# Patient Record
Sex: MALE | URBAN - METROPOLITAN AREA
[De-identification: names, ages, dates, MRNs, and addresses within clinical notes are randomized per-mention and may not be internally consistent; named-entity substitution may affect disease eponyms.]

---

## 2018-03-01 ENCOUNTER — TRANSFERRED RECORDS (OUTPATIENT)
Dept: HEALTH INFORMATION MANAGEMENT | Facility: CLINIC | Age: 33
End: 2018-03-01

## 2018-03-02 ENCOUNTER — TRANSFERRED RECORDS (OUTPATIENT)
Dept: HEALTH INFORMATION MANAGEMENT | Facility: CLINIC | Age: 33
End: 2018-03-02

## 2018-03-09 ENCOUNTER — TRANSFERRED RECORDS (OUTPATIENT)
Dept: HEALTH INFORMATION MANAGEMENT | Facility: CLINIC | Age: 33
End: 2018-03-09

## 2018-03-19 ENCOUNTER — TELEPHONE (OUTPATIENT)
Dept: ORTHOPEDICS | Facility: CLINIC | Age: 33
End: 2018-03-19

## 2018-03-19 NOTE — TELEPHONE ENCOUNTER
Rianna from Dr. Hernandez's office at [facility] called requesting patient be seen for L thigh mass.  Patient scheduled to see Dr. Pierce on 3/21/18 at 8:30.    Films in PACS.  I will be faxing clinic notes and surgical path report to urgent scanning today 3/19.    Nell pierre

## 2018-03-21 ENCOUNTER — OFFICE VISIT (OUTPATIENT)
Dept: ORTHOPEDICS | Facility: CLINIC | Age: 33
End: 2018-03-21
Payer: COMMERCIAL

## 2018-03-21 VITALS — WEIGHT: 188 LBS | BODY MASS INDEX: 24.92 KG/M2 | HEIGHT: 73 IN

## 2018-03-21 DIAGNOSIS — M79.89 SOFT TISSUE MASS: Primary | ICD-10-CM

## 2018-03-21 ASSESSMENT — ENCOUNTER SYMPTOMS
POLYDIPSIA: 0
FEVER: 0
NAIL CHANGES: 0
FLANK PAIN: 0
EYE WATERING: 0
SKIN CHANGES: 0
HEMATURIA: 0
EYE PAIN: 0
HALLUCINATIONS: 0
EYE IRRITATION: 0
NECK PAIN: 1
WEIGHT LOSS: 0
DOUBLE VISION: 0
DYSURIA: 0
STIFFNESS: 0
ALTERED TEMPERATURE REGULATION: 1
CHILLS: 0
ARTHRALGIAS: 1
DECREASED APPETITE: 0
DIFFICULTY URINATING: 0
POOR WOUND HEALING: 0
WEIGHT GAIN: 0
BACK PAIN: 1
FATIGUE: 0
INCREASED ENERGY: 0
NIGHT SWEATS: 1
MUSCLE CRAMPS: 0
POLYPHAGIA: 1
MYALGIAS: 0
EYE REDNESS: 0
MUSCLE WEAKNESS: 0

## 2018-03-21 NOTE — LETTER
3/21/2018       RE: Nicholas Thoennes  [address] [address]     Dear Colleague,    Thank you for referring your patient, Nicholas Thoennes, to the [facility] at [facility]. Please see a copy of my visit note below.    I was present with the resident during the history and exam.  I discussed the case with the resident and agree with the findings as documented in the assessment and plan.    Orthopedic Surgery Consult / History and Physical    Primary care provider: No Ref-Primary, Physician           Chief Concern:   Evaluate left thigh soft tissue mass           History of Present Illness:   This patient is a healthy 32 year old male who presents with a left anterior thigh mass.  First noticed approximately 2-3 months ago along along his anterior thigh.  No pain.  No impairment in function of his quadriceps or leg.  No numbness associated.  No associated lymph nodes.    He thought he should have it checked out, went and saw his primary doctor who ordered an MRI showing a lesion along the anterior quadriceps muscle which is bright on T2, dark on T1.  Attempt was made at needle aspiration.  No fluid or tissue was able to be removed.  Subsequently patient was subsequently referred to our clinic for further evaluation.            Past Medical History:   None         Past Surgical History:   Age 7  Past Surgical History:   Procedure Laterality Date     KNEE SURGERY              Social History:   Occupation:      Social History   Substance Use Topics     Smoking status: Current Every Day Smoker     Types: Dip, chew, snus or snuff     Smokeless tobacco: Current User     Alcohol use Yes            Family History:   Denies family history of bleeding or clotting disorders  Family History   Problem Relation Age of Onset     Other Cancer Father      Colon Cancer Paternal Grandmother             Allergies:   No Known Allergies          Medications:   Anticoagulants:  None  No current outpatient prescriptions on file.     No current facility-administered medications for this visit.               Physical Exam:   General: awake, alert, cooperative, no apparent distress, appears stated age  HEENT: normal  Respiratory: breathing non-labored  Cardiovascular: peripheral pulses palpable and symmetric, capillary refill < 2sec  Skin: no rashes or lesions  Neurological: CN II-XII grossly intact  Musculoskeletal:     LLE   Skin C/D/I   No palpable mass   Motor: Quads, hamstrings, EHL, TA, FHL, GS 5/5   SILT on SP, DP, S, S, and T nerve territories   Circulation: palpable DP and TP, foot warm     No inguinal lymph nodes         Data:   Imagin18 MRI L thigh:  14cm length lesion within quadriceps tendon, fluid filled.  No enhancement on contrast MRI, no surrounding edema. No heterogeneity.  Appears to be a ganglion           Assessment and Plan:   Assessment:  32M with left thigh quadriceps tendon ganglion. Not a tumor as does not enhance on contrast MRI.  Benign process.     Plan:  1. Discussed that we could excise this, or continue to watch.  We do not think it is dangerous at all.  2. After discussion the patient would like to observe. We'll plan to re-evaluate with contrast MRI around 2018     Seen with Dr. Pierce, documentation by:    Mark Mix MD  Orthopaedic Surgery PGY-4       Again, thank you for allowing me to participate in the care of your patient.      Sincerely,    Alec Pierce MD

## 2018-03-21 NOTE — MR AVS SNAPSHOT
"              After Visit Summary   3/21/2018    Nicholas Thoennes    MRN: [id number]           Patient Information     Date Of Birth          1985        Visit Information        Provider Department      3/21/2018 8:30 AM Alec Pierce MD [facility]        Today's Diagnoses     Soft tissue mass    -  1       Follow-ups after your visit        Who to contact     Please call your clinic at [phone] to:    Ask questions about your health    Make or cancel appointments    Discuss your medicines    Learn about your test results    Speak to your doctor            Additional Information About Your Visit        MyChart Information     [employer] is an electronic gateway that provides easy, online access to your medical records. With [employer], you can request a clinic appointment, read your test results, renew a prescription or communicate with your care team.     To sign up for [employer] visit the website at [email or web address]   You will be asked to enter the access code listed below, as well as some personal information. Please follow the directions to create your username and password.     Your access code is: [email or web address]  Expires: 2018  6:30 AM     Your access code will  in 90 days. If you need help or a new code, please contact your [facility] or call [phone] for assistance.        Care EveryWhere ID     This is your Care EveryWhere ID. This could be used by other organizations to access your [place] medical records  [id number]        Your Vitals Were     Height BMI (Body Mass Index)                1.854 m (6' 1\") 24.8 kg/m2           Blood Pressure from Last 3 Encounters:   No data found for BP    Weight from Last 3 Encounters:   18 85.3 kg (188 lb)              Today, you had the following     No orders found for display       Primary Care Provider Fax #    Physician No Ref-Primary [phone]       No address on file   "      Equal Access to Services     [facility] : Hadii aad ku hadheidymarilou Diaz, wahalleserena mckeon, edwardhumberto patterson. So [facility] [phone].    ATENCIÓN: Si habla español, tiene a españa disposición servicios gratuitos de asistencia lingüística. Llame al [phone].    We comply with applicable federal civil rights laws and [place] laws. We do not discriminate on the basis of race, color, national origin, age, disability, sex, sexual orientation, or gender identity.            Thank you!     Thank you for choosing [facility]  for your care. Our goal is always to provide you with excellent care. Hearing back from our patients is one way we can continue to improve our services. Please take a few minutes to complete the written survey that you may receive in the mail after your visit with us. Thank you!             Your Updated Medication List - Protect others around you: Learn how to safely use, store and throw away your medicines at www.disposemymeds.org.      Notice  As of 3/21/2018 12:20 PM    You have not been prescribed any medications.

## 2018-03-21 NOTE — NURSING NOTE
"Reason For Visit:   Chief Complaint   Patient presents with     Consult     Pt. states that he is here today for Left Thigh Mass. He mention that he noticed the mass about 3 months, increase in size. HX: Left Thigh Mass Core Biopsy. on 03/09/2018 @ [facility].  Referring:  ISAAK HOFFMAN       Pain Assessment  Patient Currently in Pain: No               HEIGHT: 6' 1\", WEIGHT: 188 lbs 0 oz, BMI: Body mass index is 24.8 kg/(m^2).      No current outpatient prescriptions on file.        No Known Allergies            "

## 2018-03-21 NOTE — PROGRESS NOTES
Orthopedic Surgery Consult / History and Physical    Primary care provider: No Ref-Primary, Physician           Chief Concern:   Evaluate left thigh soft tissue mass           History of Present Illness:   This patient is a healthy 32 year old male who presents with a left anterior thigh mass.  First noticed approximately 2-3 months ago along along his anterior thigh.  No pain.  No impairment in function of his quadriceps or leg.  No numbness associated.  No associated lymph nodes.    He thought he should have it checked out, went and saw his primary doctor who ordered an MRI showing a lesion along the anterior quadriceps muscle which is bright on T2, dark on T1.  Attempt was made at needle aspiration.  No fluid or tissue was able to be removed.  Subsequently patient was subsequently referred to our clinic for further evaluation.            Past Medical History:   None         Past Surgical History:   Age 7  Past Surgical History:   Procedure Laterality Date     KNEE SURGERY              Social History:   Occupation:      Social History   Substance Use Topics     Smoking status: Current Every Day Smoker     Types: Dip, chew, snus or snuff     Smokeless tobacco: Current User     Alcohol use Yes            Family History:   Denies family history of bleeding or clotting disorders  Family History   Problem Relation Age of Onset     Other Cancer Father      Colon Cancer Paternal Grandmother             Allergies:   No Known Allergies         Medications:   Anticoagulants:  None  No current outpatient prescriptions on file.     No current facility-administered medications for this visit.               Physical Exam:   General: awake, alert, cooperative, no apparent distress, appears stated age  HEENT: normal  Respiratory: breathing non-labored  Cardiovascular: peripheral pulses palpable and symmetric, capillary refill < 2sec  Skin: no rashes or lesions  Neurological: CN II-XII grossly  intact  Musculoskeletal:     LLE   Skin C/D/I   No palpable mass   Motor: Quads, hamstrings, EHL, TA, FHL, GS 5/5   SILT on SP, DP, S, S, and T nerve territories   Circulation: palpable DP and TP, foot warm     No inguinal lymph nodes         Data:   Imagin18 MRI L thigh:  14cm length lesion within quadriceps tendon, fluid filled.  No enhancement on contrast MRI, no surrounding edema. No heterogeneity.  Appears to be a ganglion           Assessment and Plan:   Assessment:  32M with left thigh quadriceps tendon ganglion. Not a tumor as does not enhance on contrast MRI.  Benign process.     Plan:  1. Discussed that we could excise this, or continue to watch.  We do not think it is dangerous at all.  2. After discussion the patient would like to observe. We'll plan to re-evaluate with contrast MRI around 2018             Seen with Dr. Pierce, documentation by:    Mark Mix MD  Orthopaedic Surgery PGY-4        Answers for HPI/ROS submitted by the patient on 3/21/2018   General Symptoms: Yes  Skin Symptoms: Yes  HENT Symptoms: No  EYE SYMPTOMS: Yes  HEART SYMPTOMS: No  LUNG SYMPTOMS: No  INTESTINAL SYMPTOMS: No  URINARY SYMPTOMS: Yes  REPRODUCTIVE SYMPTOMS: No  SKELETAL SYMPTOMS: Yes  BLOOD SYMPTOMS: No  NERVOUS SYSTEM SYMPTOMS: No  MENTAL HEALTH SYMPTOMS: No  Fever: No  Loss of appetite: No  Weight loss: No  Weight gain: No  Fatigue: No  Night sweats: Yes  Chills: No  Increased stress: No  Excessive hunger: Yes  Excessive thirst: No  Feeling hot or cold when others believe the temperature is normal: Yes  Loss of height: No  Post-operative complications: No  Surgical site pain: No  Hallucinations: No  Change in or Loss of Energy: No  Hyperactivity: No  Confusion: No  Changes in hair: No  Changes in moles/birth marks: No  Itching: No  Rashes: Yes  Changes in nails: No  Acne: No  Change in facial hair: No  Warts: No  Non-healing sores: No  Scarring: No  Flaking of skin: No  Color changes of hands/feet in  cold : No  Sun sensitivity: No  Skin thickening: No  Eye pain: No  Vision loss: No  Dry eyes: No  Watery eyes: No  Eye bulging: No  Double vision: No  Flashing of lights: No  Spots: No  Floaters: Yes  Redness: No  Crossed eyes: No  Tunnel Vision: No  Yellowing of eyes: No  Eye irritation: No  Trouble holding urine or incontinence: No  Pain or burning: No  Trouble starting or stopping: No  Increased frequency of urination: Yes  Blood in urine: No  Decreased frequency of urination: No  Frequent nighttime urination: No  Flank pain: No  Difficulty emptying bladder: No  Back pain: Yes  Muscle aches: No  Neck pain: Yes  Joint pain: Yes  Bone pain: No  Muscle cramps: No  Muscle weakness: No  Joint stiffness: No  Bone fracture: No

## 2018-03-28 DIAGNOSIS — M79.89 SOFT TISSUE MASS: Primary | ICD-10-CM

## 2018-08-24 ENCOUNTER — RADIANT APPOINTMENT (OUTPATIENT)
Dept: MRI IMAGING | Facility: CLINIC | Age: 33
End: 2018-08-24
Attending: ORTHOPAEDIC SURGERY
Payer: COMMERCIAL

## 2018-08-24 ENCOUNTER — OFFICE VISIT (OUTPATIENT)
Dept: ORTHOPEDICS | Facility: CLINIC | Age: 33
End: 2018-08-24
Payer: COMMERCIAL

## 2018-08-24 VITALS — HEIGHT: 73 IN | WEIGHT: 189.6 LBS | BODY MASS INDEX: 25.13 KG/M2

## 2018-08-24 DIAGNOSIS — M79.89 SOFT TISSUE MASS: ICD-10-CM

## 2018-08-24 DIAGNOSIS — M67.40 GANGLION CYST: Primary | ICD-10-CM

## 2018-08-24 RX ORDER — GADOBUTROL 604.72 MG/ML
7.5 INJECTION INTRAVENOUS ONCE
Status: COMPLETED | OUTPATIENT
Start: 2018-08-24 | End: 2018-08-24

## 2018-08-24 RX ADMIN — GADOBUTROL 7.5 ML: 604.72 INJECTION INTRAVENOUS at 13:14

## 2018-08-24 NOTE — MR AVS SNAPSHOT
"              After Visit Summary   2018    Nicholas Thoennes    MRN: [id number]           Patient Information     Date Of Birth          1985        Visit Information        Provider Department      2018 2:45 PM [facility]        Today's Diagnoses     Ganglion cyst    -  1       Follow-ups after your visit        Who to contact     Please call your clinic at [phone] to:    Ask questions about your health    Make or cancel appointments    Discuss your medicines    Learn about your test results    Speak to your doctor            Additional Information About Your Visit        MyChart Information     [employer] is an electronic gateway that provides easy, online access to your medical records. With [employer], you can request a clinic appointment, read your test results, renew a prescription or communicate with your care team.     To sign up for Care Team Connectt visit the website at [email or web address]   You will be asked to enter the access code listed below, as well as some personal information. Please follow the directions to create your username and password.     Your access code is: [email or web address]  Expires: 2018  6:30 AM     Your access code will  in 90 days. If you need help or a new code, please contact your [facility] or call [phone] for assistance.        Care EveryWhere ID     This is your Care EveryWhere ID. This could be used by other organizations to access your [place] medical records  [id number]        Your Vitals Were     Height BMI (Body Mass Index)                1.845 m (6' 0.64\") 25.26 kg/m2           Blood Pressure from Last 3 Encounters:   No data found for BP    Weight from Last 3 Encounters:   18 86 kg (189 lb 9.6 oz)   18 85.3 kg (188 lb)              Today, you had the following     No orders found for display       Primary Care Provider Fax #    Physician No Ref-Primary " [phone]       No address on file        Equal Access to Services     JOSEPH DEQUAN : Hadii aad ku hadheidymarilou Diaz, isaias mckeon, edwardsonido coonmaserena howard, humberto anguianoin hayaajohnathan clineremi fekiarramaribell ceballos. So [facility] [phone].    ATENCIÓN: Si habla español, tiene a españa disposición servicios gratuitos de asistencia lingüística. Llame al [phone].    We comply with applicable federal civil rights laws and [place] laws. We do not discriminate on the basis of race, color, national origin, age, disability, sex, sexual orientation, or gender identity.            Thank you!     Thank you for choosing [facility]  for your care. Our goal is always to provide you with excellent care. Hearing back from our patients is one way we can continue to improve our services. Please take a few minutes to complete the written survey that you may receive in the mail after your visit with us. Thank you!             Your Updated Medication List - Protect others around you: Learn how to safely use, store and throw away your medicines at www.disposemymeds.org.      Notice  As of 8/24/2018  3:00 PM    You have not been prescribed any medications.

## 2018-08-24 NOTE — NURSING NOTE
"Reason For Visit:   Chief Complaint   Patient presents with     Left Thigh - RECHECK     Follow up right thigh mass       Ht 1.845 m (6' 0.64\")  Wt 86 kg (189 lb 9.6 oz)  BMI 25.26 kg/m2    Pain Assessment  Patient Currently in Pain: Ellis Kraus, ATC  "

## 2018-08-24 NOTE — LETTER
8/24/2018       RE: Nicholas Thoennes  [address] [address]     Dear Colleague,    Thank you for referring your patient, Nicholas Thoennes, [facility]. Please see a copy of my visit note below.    Chief Complaint: Left thigh quadriceps tendon ganglion    HPI: Justin is a 33-year-old male who is here for follow-up and review of MRI of his left thigh.  He has a history of a left thigh quadriceps tendon ganglion cyst.  We wanted to have a repeat MRI since the last time we saw him 5 months ago to see if there have been any changes.  Per his report, he denies any pain or discomfort recently.  He cannot feel it in the thigh.  He is doing all of the activities he wants to do.  No other concerns.    Physical Exam: Justin is a healthy-appearing 33-year-old male who is alert and oriented in no apparent distress.  He has a nonantalgic reciprocal gait today.  His left thigh shows no palpable mass or swelling.  He has full range of motion and strength.  He is neurovascularly intact distally.    Imaging: MRI of the left thigh with and without contrast was done today.  This shows a similar appearance to the previous MRI in terms of enhancement.  This is a fluid-filled cyst.  It has gotten smaller in circumference, although the length remains similar.  This appears to be a benign ganglion cyst that is improved from the previous scan.    Impression: 33-year-old male with a history of a left thigh quadriceps tendon ganglion, without symptoms and smaller on MRI today    Plan: We are satisfied with the MRI images today showing that this confirms a ganglion mass that has decreased in size.  We would not recommend any further imaging for this, and less he noted increased pain or swelling in that area.  He can participate in all activities as tolerated.  He can call us if he has any concerns.  All questions answered.  Patient was also  examined by Dr. Pierce and he agrees with the plan of care.       I was present with the PA during the history and exam.  I discussed the case with the PA and agree with the findings as documented in the assessment and plan.    Again, thank you for allowing me to participate in the care of your patient.      Sincerely,    Alec Pierce MD

## 2022-12-17 NOTE — PROGRESS NOTES
Chief Complaint: Left thigh quadriceps tendon ganglion    HPI: Justin is a 33-year-old male who is here for follow-up and review of MRI of his left thigh.  He has a history of a left thigh quadriceps tendon ganglion cyst.  We wanted to have a repeat MRI since the last time we saw him 5 months ago to see if there have been any changes.  Per his report, he denies any pain or discomfort recently.  He cannot feel it in the thigh.  He is doing all of the activities he wants to do.  No other concerns.    Physical Exam: Justin is a healthy-appearing 33-year-old male who is alert and oriented in no apparent distress.  He has a nonantalgic reciprocal gait today.  His left thigh shows no palpable mass or swelling.  He has full range of motion and strength.  He is neurovascularly intact distally.    Imaging: MRI of the left thigh with and without contrast was done today.  This shows a similar appearance to the previous MRI in terms of enhancement.  This is a fluid-filled cyst.  It has gotten smaller in circumference, although the length remains similar.  This appears to be a benign ganglion cyst that is improved from the previous scan.    Impression: 33-year-old male with a history of a left thigh quadriceps tendon ganglion, without symptoms and smaller on MRI today    Plan: We are satisfied with the MRI images today showing that this confirms a ganglion mass that has decreased in size.  We would not recommend any further imaging for this, and less he noted increased pain or swelling in that area.  He can participate in all activities as tolerated.  He can call us if he has any concerns.  All questions answered.  Patient was also examined by Dr. Pierce and he agrees with the plan of care.      Call bell/Explanation of exam/test/Side rails Bilobed Transposition Flap Text: The defect edges were debeveled with a #15 scalpel blade.  Given the location of the defect and the proximity to free margins a bilobed transposition flap was deemed most appropriate.  Using a sterile surgical marker, an appropriate bilobe flap drawn around the defect.    The area thus outlined was incised deep to adipose tissue with a #15 scalpel blade.  The skin margins were undermined to an appropriate distance in all directions utilizing iris scissors.